# Patient Record
Sex: FEMALE | Race: WHITE | NOT HISPANIC OR LATINO | URBAN - METROPOLITAN AREA
[De-identification: names, ages, dates, MRNs, and addresses within clinical notes are randomized per-mention and may not be internally consistent; named-entity substitution may affect disease eponyms.]

---

## 2022-04-20 ENCOUNTER — OUTPATIENT (OUTPATIENT)
Dept: INPATIENT UNIT | Facility: HOSPITAL | Age: 27
LOS: 1 days | End: 2022-04-20
Payer: MEDICAID

## 2022-04-20 VITALS — HEART RATE: 93 BPM | SYSTOLIC BLOOD PRESSURE: 121 MMHG | DIASTOLIC BLOOD PRESSURE: 66 MMHG

## 2022-04-20 VITALS — TEMPERATURE: 98 F | RESPIRATION RATE: 18 BRPM

## 2022-04-20 DIAGNOSIS — O47.02 FALSE LABOR BEFORE 37 COMPLETED WEEKS OF GESTATION, SECOND TRIMESTER: ICD-10-CM

## 2022-04-20 LAB
APPEARANCE UR: CLEAR — SIGNIFICANT CHANGE UP
BACTERIA # UR AUTO: ABNORMAL
BILIRUB UR-MCNC: NEGATIVE — SIGNIFICANT CHANGE UP
COLOR SPEC: YELLOW — SIGNIFICANT CHANGE UP
DIFF PNL FLD: ABNORMAL
EPI CELLS # UR: SIGNIFICANT CHANGE UP
GLUCOSE UR QL: NEGATIVE MG/DL — SIGNIFICANT CHANGE UP
KETONES UR-MCNC: NEGATIVE — SIGNIFICANT CHANGE UP
LEUKOCYTE ESTERASE UR-ACNC: NEGATIVE — SIGNIFICANT CHANGE UP
NITRITE UR-MCNC: NEGATIVE — SIGNIFICANT CHANGE UP
PH UR: 7 — SIGNIFICANT CHANGE UP (ref 5–8)
PROT UR-MCNC: NEGATIVE — SIGNIFICANT CHANGE UP
RBC CASTS # UR COMP ASSIST: >50 /HPF (ref 0–4)
SP GR SPEC: 1.01 — SIGNIFICANT CHANGE UP (ref 1.01–1.02)
UROBILINOGEN FLD QL: NEGATIVE MG/DL — SIGNIFICANT CHANGE UP
WBC UR QL: SIGNIFICANT CHANGE UP /HPF (ref 0–5)

## 2022-04-20 PROCEDURE — 59025 FETAL NON-STRESS TEST: CPT

## 2022-04-20 PROCEDURE — G0463: CPT

## 2022-04-20 PROCEDURE — 87086 URINE CULTURE/COLONY COUNT: CPT

## 2022-04-20 PROCEDURE — 81001 URINALYSIS AUTO W/SCOPE: CPT

## 2022-04-20 PROCEDURE — 99202 OFFICE O/P NEW SF 15 MIN: CPT

## 2022-04-20 RX ORDER — NITROFURANTOIN MACROCRYSTAL 50 MG
1 CAPSULE ORAL
Qty: 14 | Refills: 0
Start: 2022-04-20 | End: 2022-04-26

## 2022-04-20 NOTE — OB RN TRIAGE NOTE - FALL HARM RISK - UNIVERSAL INTERVENTIONS
Bed in lowest position, wheels locked, appropriate side rails in place/Call bell, personal items and telephone in reach/Instruct patient to call for assistance before getting out of bed or chair/Non-slip footwear when patient is out of bed/[place] to call system/Physically safe environment - no spills, clutter or unnecessary equipment/Purposeful Proactive Rounding/Room/bathroom lighting operational, light cord in reach

## 2022-04-20 NOTE — OB PROVIDER TRIAGE NOTE - NSICDXFAMILYHX_GEN_ALL_CORE_FT
FAMILY HISTORY:  Father  Still living? Unknown  FH: HTN (hypertension), Age at diagnosis: Age Unknown  FH: type 2 diabetes mellitus, Age at diagnosis: Age Unknown    Mother  Still living? Unknown  FH: HTN (hypertension), Age at diagnosis: Age Unknown  FH: type 2 diabetes mellitus, Age at diagnosis: Age Unknown

## 2022-04-20 NOTE — OB PROVIDER TRIAGE NOTE - ATTENDING COMMENTS
25 yo  at 22w0d who presents for microscopic blood in urine and lower abdominal pain. 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 No fever, chills, or upper back pain  VSS  sono + FH and fetal movement  UA - large blood with trace bacteria  25 y/o  @ 22 wks with hematuria and suprapubic discomfort concerning for possible UTI. U cx sent, but will treat empirically. ADvised to take the abx and if persistent hematuria noted, she may need to see urology as outpatient.     Margi Martell MD

## 2022-04-20 NOTE — OB RN TRIAGE NOTE - NSNURSINGINSTR_OBGYN_ALL_OB_FT
Pt discharged home as per MD.  Discharge instructions provided, pt verbalized an understanding of instructions.  Pt instructed to take medication as ordered by MD as to follow up with MD as scheduled.  Pt ambulated off floor in [facility].  Elieser ARREDONDO

## 2022-04-20 NOTE — OB PROVIDER TRIAGE NOTE - NSOBPROVIDERNOTE_OBGYN_ALL_OB_FT
27 yo F  at 22w0d who presents for microscopic blood in urine and lower abdominal pain. Lower abdominal pain is intermittent and positional, likely secondary to round ligament laxity at 22w gestation. Microscopic blood may be incidental finding and will repeat UA. Bedside sono shows +fetal movements and FHR 147bpm.     Plan:  - Obtain UA and UCx  - Encourage PO fluid intake   - Dispo: home    D/w Dr. Martell 25 yo F  at 22w0d who presents for microscopic blood in urine and lower abdominal pain. Lower abdominal pain is intermittent and positional, likely secondary to round ligament laxity at 22w gestation. Microscopic blood may be incidental finding and will repeat UA. Bedside sono shows +fetal movements and FHR 147bpm.     Plan:  - Obtain UA and UCx  - Encourage PO fluid intake   - Dispo: home    -------  UA pos for blood and occasional bacteria, leukocyte esterase and nitrite neg  Given clinical presentation and low threshold for treatment of UTI in pregnancy, will treat with Macrobid 100mg BID x 7 days  will follow up urine culture  Encourage PO intake  Dispo: home with antibiotics and follow up with outpatient OBGYN office    D/w Dr. Martell

## 2022-04-20 NOTE — OB PROVIDER TRIAGE NOTE - NS_PARA_OBGYN_ALL_OB_NU
Patient scheduled 3.22.18 at 2:45 pm.   
Patient would like to establish with an internal medicine doctor for a PCP.  Please contact wife Carla at [phone]  
Please call patient to schedule 30 min new pt appt.   
0

## 2022-04-20 NOTE — OB PROVIDER TRIAGE NOTE - HISTORY OF PRESENT ILLNESS
27 yo F  at 22w0d who presents for microscopic blood in urine and lower abdominal pain. Patient states that lower abdominal pain started 3 days ago, was intermittent. and became more constant today. Pain currently resolved. Patient noticed urine being a "peachy color" at 3PM today and did an office dipstick showing presence of RBCs. Called OB who told her to come in for evaluation. Denies vaginal bleeding, loss of fluids, contractions. Noticed decreased fetal movements since 11AM today. Endorses headaches, increased urinary frequency, but no dysuria. Denies lightheadedness, vision changes, SOB, chest pain, diarrhea, or constipation. Current pregnancy is uncomplicated.     OBHx: [place]  Gyn Hx: +PCOS, +ovarian cysts. Denies fibroids, endometriosis, abnormal paps, or history of STDs  PMHx: none  SurgHx: none  FHx: DM2, HTN, HLD in mom and dad  Allergies: NKDA  Meds: PNV, Mg qHS  Social: Denies x3

## 2022-04-20 NOTE — OB PROVIDER TRIAGE NOTE - NSHPPHYSICALEXAM_GEN_ALL_CORE
Vital Signs Last 24 Hrs  T(C): 37 (20 Apr 2022 21:47), Max: 37 (20 Apr 2022 21:47)  T(F): 98.6 (20 Apr 2022 21:47), Max: 98.6 (20 Apr 2022 21:47)  HR: 117 (20 Apr 2022 21:50) (117 - 117)  BP: 126/73 (20 Apr 2022 21:50) (126/73 - 126/73)  RR: 18 (20 Apr 2022 21:47) (18 - 18)    Gen: well-appearing, in no acute distress  Resp: CTAB  CV: RRR  Abd: soft, non-tender, gravid   Ext: warm, no edema     FHT: Baseline 140bpm, min maria isabel, +accels, -decels   [place]: no contractions    Bedside sono:  - fetal movements present   - FHR 147bpm

## 2022-04-22 LAB
CULTURE RESULTS: SIGNIFICANT CHANGE UP
SPECIMEN SOURCE: SIGNIFICANT CHANGE UP
